# Patient Record
Sex: FEMALE | Race: WHITE
[De-identification: names, ages, dates, MRNs, and addresses within clinical notes are randomized per-mention and may not be internally consistent; named-entity substitution may affect disease eponyms.]

---

## 2018-07-19 NOTE — PCM.CONS
H&P History of Present Illness





- General


Date of Service: 07/19/18


Admit Problem/Dx: 


 Admission Diagnosis/Problem





Admission Diagnosis/Problem      Anemia








Source of Information: Patient


History Limitations: Reports: No Limitations





- History of Present Illness


Initial Comments - Free Text/Narative: 


Patient is a 72 year old female who presents with severe anemia. She was seen 

in clinic today by a primary care provider. Lab work was performed that 

revealed a hgb of 3.5. She was admitted to the hospitalist team and is 

currently being transfused. She denies any change in her bowel habits. She 

denies melena or hematochezia. She has chronic GERD and takes omeprazole for 

this. Her symptoms have been stable. She denies nausea or vomiting. She denies 

abdominal pain. She does admit to slowly feeling weaker and more SOB with 

activity over the last couple months. 





- Related Data


Allergies/Adverse Reactions: 


 Allergies











Allergy/AdvReac Type Severity Reaction Status Date / Time


 


novacaine Allergy  Vomiting Uncoded 02/21/17 14:23











Home Medications: 


 Home Meds





PARoxetine [Paxil] 30 mg PO DAILY 02/21/17 [History]


Rosuvastatin [Crestor] 20 mg PO DAILY 02/21/17 [History]


rOPINIRole [Requip] 1 mg PO BEDTIME 02/21/17 [History]


Albuterol [Ventolin HFA] 2 inh IH Q6H PRN 02/22/17 [History]


Cephalexin [Keflex] 500 mg PO BID #12 capsule 02/22/17 [Rx]


Colestipol [Colestipol HCl] 2 gm PO DAILY PRN 02/22/17 [History]


Fluticasone Propionate [Flonase] 1 gm NASBOTH DAILY #1 bottle 02/22/17 [Rx]


Fluticasone/Salmeterol [Advair 250-50 Diskus] 1 inh IH BID PRN 02/22/17 [History

]


LORazepam [Ativan] 0.25 mg PO BID PRN #5 tablet 02/22/17 [Rx]


Latanoprost [Xalatan 0.005% Ophth Soln] 1 drop EYEBOTH BEDTIME 02/22/17 [History

]


Ursodiol [Actigal] 300 mg PO DAILY PRN 02/22/17 [History]











Past Medical History


HEENT History: Reports: Impaired Vision, Macular Degeneration


Cardiovascular History: Reports: High Cholesterol, Other (See Below)


Other Cardiovascular History: hypotension


Respiratory History: Reports: Asthma, Bronchitis, Recurrent, Pneumonia, 

Recurrent


Gastrointestinal History: Reports: Chronic Diarrhea


Genitourinary History: Reports: None


OB/GYN History: Reports: Pregnancy


Musculoskeletal History: Reports: Arthritis, Back Pain, Chronic, Fracture, 

Osteoarthritis, Osteoporosis


Neurological History: Reports: Vertigo, Other (See Below)


Other Neuro History: panic attacks


Psychiatric History: Reports: Anxiety, Panic Attack


Endocrine/Metabolic History: Reports: None


Hematologic History: Reports: None


Immunologic History: Reports: None


Oncologic (Cancer) History: Reports: None


Dermatologic History: Reports: None





- Infectious Disease History


Infectious Disease History: Reports: Chicken Pox, Measles, Mumps, Rubella


Other Infectious Disease History: wooping cough





- Past Surgical History


HEENT Surgical History: Reports: Cataract Surgery


GI Surgical History: Reports: Colonoscopy, Polypectomy


Female  Surgical History: Reports: Breast Biopsy, Hysterectomy





Social & Family History





- Family History


Family Medical History: Noncontributory





- Tobacco Use


Smoking Status *Q: Never Smoker


Second Hand Smoke Exposure: No





- Caffeine Use


Caffeine Use: Reports: Coffee





- Recreational Drug Use


Recreational Drug Use: No





H&P Review of Systems





- Review of Systems:


Review Of Systems: ROS reveals no pertinent complaints other than HPI.





Exam





- Exam


Exam: See Below





- Vital Signs


Vital Signs: 


 Last Vital Signs











Temp  36.4 C   07/19/18 22:00


 


Pulse  85   07/19/18 22:00


 


Resp  18   07/19/18 21:38


 


BP  123/58 L  07/19/18 22:00


 


Pulse Ox  97   07/19/18 22:00











Weight: 55.656 kg





- Exam


General: Alert, Oriented


HEENT: Conjunctiva Clear, Mucosa Moist & Pink, Posterior Pharynx Clear


Neck: Supple, Trachea Midline


Lungs: Clear to Auscultation, Normal Respiratory Effort


Cardiovascular: Regular Rate, Systolic Murmur


GI/Abdominal Exam: Soft, Non-Tender, No Distention, No Mass


 (Female) Exam: Normal External Exam


Rectal (Female) Exam: Normal Exam, Normal Rectal Tone, Heme - Stool.  No: Black 

Stool, Bloody Stool, Decreased Rectal Tone, Fecal Impaction, Mass


Back Exam: Normal Inspection, Full Range of Motion


Extremities: Normal Inspection, Normal Range of Motion





- Patient Data


Lab Results Last 24 hrs: 


 Laboratory Results - last 24 hr











  07/19/18 07/19/18 07/19/18 Range/Units





  17:55 17:55 17:55 


 


WBC    6.44  (4.0-11.0)  K/uL


 


RBC    2.40 L  (4.30-5.90)  M/uL


 


Hgb    3.5 L*  (12.0-16.0)  g/dL


 


Hct    13.8 L  (36.0-46.0)  %


 


MCV    57.5 L  (80.0-98.0)  fL


 


MCH    14.6 L  (27.0-32.0)  pg


 


MCHC    25.4 L  (31.0-37.0)  g/dL


 


RDW Std Deviation    41.1  (28.0-62.0)  fl


 


RDW Coeff of Ulises    19 H  (11.0-15.0)  %


 


Plt Count    382  (150-400)  K/uL


 


MPV    9.40  (7.40-12.00)  fL


 


Nucleated RBC %    0.0  /100WBC


 


Nucleated RBCs #    0  K/uL


 


Absolute Retic    31.70  (20-80)  K/uL


 


Percent Retic    1.3  (0.5-1.5)  %


 


Immature Retic Fraction    15  %


 


Sodium     (136-145)  mmol/L


 


Potassium     (3.5-5.1)  mmol/L


 


Chloride     ()  mmol/L


 


Carbon Dioxide     (21.0-32.0)  mmol/L


 


BUN     (7.0-18.0)  mg/dL


 


Creatinine     (0.6-1.0)  mg/dL


 


Est Cr Clr Drug Dosing     mL/min


 


Estimated GFR (MDRD)     ml/min


 


Glucose     ()  mg/dL


 


Calcium     (8.5-10.1)  mg/dL


 


Iron     ()  ug/dL


 


TIBC     (250-450)  ug/dL


 


% Saturation     (20-55)  %


 


Ferritin     (8-252)  ng/mL


 


Total Bilirubin     (0.2-1.0)  mg/dL


 


AST     (15-37)  IU/L


 


ALT     (14-63)  IU/L


 


Alkaline Phosphatase     ()  U/L


 


Troponin I  < 0.050    (0.000-0.056)  ng/mL


 


Total Protein     (6.4-8.2)  g/dL


 


Albumin     (3.4-5.0)  g/dL


 


Globulin     (2.0-3.5)  g/dL


 


Albumin/Globulin Ratio     (1.3-2.8)  


 


Vitamin B12     (193-986)  pg/mL


 


Folate     (8.60-58.90)  ng/mL


 


Free T4     (0.76-1.46)  ng/dL


 


TSH 3rd Generation     (0.36-3.74)  uIU/mL


 


Blood Type   A POSITIVE   


 


Antibody Screen   NEGATIVE   


 


Crossmatch   See Detail   














  07/19/18 07/19/18 07/19/18 Range/Units





  17:55 17:55 17:55 


 


WBC     (4.0-11.0)  K/uL


 


RBC     (4.30-5.90)  M/uL


 


Hgb     (12.0-16.0)  g/dL


 


Hct     (36.0-46.0)  %


 


MCV     (80.0-98.0)  fL


 


MCH     (27.0-32.0)  pg


 


MCHC     (31.0-37.0)  g/dL


 


RDW Std Deviation     (28.0-62.0)  fl


 


RDW Coeff of Ulises     (11.0-15.0)  %


 


Plt Count     (150-400)  K/uL


 


MPV     (7.40-12.00)  fL


 


Nucleated RBC %     /100WBC


 


Nucleated RBCs #     K/uL


 


Absolute Retic     (20-80)  K/uL


 


Percent Retic     (0.5-1.5)  %


 


Immature Retic Fraction     %


 


Sodium   138   (136-145)  mmol/L


 


Potassium   3.4 L   (3.5-5.1)  mmol/L


 


Chloride   104   ()  mmol/L


 


Carbon Dioxide   24.8   (21.0-32.0)  mmol/L


 


BUN   7   (7.0-18.0)  mg/dL


 


Creatinine   0.8   (0.6-1.0)  mg/dL


 


Est Cr Clr Drug Dosing   48.80   mL/min


 


Estimated GFR (MDRD)   > 60.0   ml/min


 


Glucose   90   ()  mg/dL


 


Calcium   8.4 L   (8.5-10.1)  mg/dL


 


Iron    9 L  ()  ug/dL


 


TIBC    418  (250-450)  ug/dL


 


% Saturation    2.15 L  (20-55)  %


 


Ferritin    3 L  (8-252)  ng/mL


 


Total Bilirubin   0.2   (0.2-1.0)  mg/dL


 


AST   17   (15-37)  IU/L


 


ALT   11 L   (14-63)  IU/L


 


Alkaline Phosphatase   69   ()  U/L


 


Troponin I     (0.000-0.056)  ng/mL


 


Total Protein   6.4   (6.4-8.2)  g/dL


 


Albumin   3.2 L   (3.4-5.0)  g/dL


 


Globulin   3.2   (2.0-3.5)  g/dL


 


Albumin/Globulin Ratio   1.0 L   (1.3-2.8)  


 


Vitamin B12   302   (193-986)  pg/mL


 


Folate   21.60   (8.60-58.90)  ng/mL


 


Free T4   0.94   (0.76-1.46)  ng/dL


 


TSH 3rd Generation  2.76    (0.36-3.74)  uIU/mL


 


Blood Type     


 


Antibody Screen     


 


Crossmatch     











Result Diagrams: 


 07/19/18 17:55





 07/19/18 17:55


Sarbjit Results Last 24 hrs: 


 Microbiology











 07/19/18 22:06 Stool Occult Blood (SARBJIT) - Final





 Stool / Feces    POSITIVE OCCULT BLOOD














Consult PN Assessment/Plan


Procedures: 


Procedures





ASSAY OF CK (CPK) (05/06/14)


ASSAY OF LACTIC ACID (02/21/17)


ASSAY OF LIPASE (08/01/14)


ASSAY OF MAGNESIUM (02/21/17)


ASSAY OF TROPONIN QUANT (02/21/17)


BLOOD CULTURE FOR BACTERIA (02/21/17)


CHEST X-RAY 2VW FRONTAL&LATL (02/21/17)


COMPLETE CBC W/AUTO DIFF WBC (02/21/17)


COMPREHEN METABOLIC PANEL (02/21/17)


CONTRAST X-RAY ESOPHAGUS (05/04/15)


CREATINE MB FRACTION (05/06/14)


CT ABD & PELV W/CONTRAST (08/01/14)


CT ANGIOGRAPHY HEAD (02/23/18)


CT ANGIOGRAPHY NECK (02/23/18)


CT HEAD/BRAIN W/O & W/DYE (02/23/18)


CT HEAD/BRAIN W/O DYE (05/06/14)


ELECTROCARDIOGRAM TRACING (02/21/17)


EMERGENCY DEPT VISIT (02/21/17)


EMERGENCY DEPT VISIT (08/01/14)


EMERGENCY DEPT VISIT (05/06/14)


HYDRATE IV INFUSION ADD-ON (02/21/17)


METABOLIC PANEL TOTAL CA (02/23/18)


ROUTINE VENIPUNCTURE (02/23/18)


THER/PROPH/DIAG INJ IV PUSH (05/06/14)


THER/PROPH/DIAG INJ SC/IM (05/06/14)


THER/PROPH/DIAG IV INF INIT (02/21/17)


TX/PRO/DX INJ NEW DRUG ADDON (02/21/17)


TX/PRO/DX INJ SAME DRUG ADON (08/01/14)


URINALYSIS AUTO W/SCOPE (02/21/17)


URINE CULTURE/COLONY COUNT (02/21/17)


X-RAY EXAM OF ABDOMEN (08/01/14)








(1) Anemia


SNOMED Code(s): 042561786


   Code(s): D64.9 - ANEMIA, UNSPECIFIED   Current Visit: Yes   


Problem List Initiated/Reviewed/Updated: Yes


Plan: 


Patient is currently receiving a blood transfusion. She has no evidence of a GI 

bleed at this time. I did collect a FOBT but grossly her stool appeared normal. 

I added on an H pylori antigen test. Will see how she responds to her 

transfusion. I recommend a routine CT abdomen pelvis while inpatient to rule 

out a source of bleeding (i.e. colonic mass, diverticulitis, ulcer ect.) If she 

responds well to transfusions and remains stable, can follow up as an 

outpatient for an EGD and colonoscopy. Will continue to follow. Call with 

questions.

## 2018-07-19 NOTE — HP
YOB: 1945

 

PRIMARY CARE PHYSICIAN:

None PCP

 

HISTORY OF PRESENT ILLNESS:

The patient is a 72-year-old female with a past medical history of osteoporosis,

glaucoma, asthma, depression, anxiety, restless legs syndrome, hyperlipidemia,

severe headaches secondary to Arnold-Chiari malformation, status post repair,

presented to her doctor's office today because she was feeling very weak, and

this is going on for the past 8 to 10 months, and is more severe since

.  The patient has severe vertigo.  She also fainted.  She denies any

blood per rectum or any black stools, no vomiting of red  blood .  Friday night
, she had vomiting and

diarrhea and it was brown.  She denies coffee-ground.  She has occasionally pain

in the abdomen that it is localized mostly in the middle of the abdomen and

lower abdomen.  Today in the office she was seen by the   nurse practitioner, 
and  blood work results came back with a hemoglobin of 4.  I was called to 
direct admit the patient, and

the patient called home to come to the hospital.

 

PAST MEDICAL HISTORY:

She has arthritis, vertigo, macular degeneration, glaucoma, and she was seen in

ER a few times because of pain in the abdomen.

 

ALLERGIES:

She is allergic to novocaine, constipation.

 

PAST SURGICAL HISTORY:

She had Arnold-Chiari malformation surgery in .  She was not allowed to have

MRI.  She had gallbladder surgery in  and she had hysterectomy.

 

FAMILY HISTORY:

She had multiple relatives with cancer.  Her daughter  of a brain cancer

last year and she was 53.  Her brother  of colon cancer at 63.  Mother 

of cancer in the abdomen.  Patient is not sure if it was stomach cancer or colon

cancer.

 

SOCIAL HISTORY:

She quit smoking in , and she smoked for about 15 years.  Alcohol use

occasionally. She used to work as a .

 

REVIEW OF SYSTEMS:

She complains of pain in her body and in her muscles, extream weakness , sob.

Otherwise 12 points ROS is negative except as in HPI



VITAL SIGNS:  At admission, her temperature 98, pulse rate 101, and blood

pressure 97/68, and respiratory rate 18, pulse oximetry 100%

 

PHYSICAL EXAMINATION:



HEENT:  Head is atraumatic, normocephalic.  Pupils equally reactive to light.

Pallor of the eye conjunctivae.

NECK:  Supple.  No thyromegaly.  No lymphadenopathy.

HEART:  S1 and S2.  Regular rate and rate.

LUNGS:  Clear to auscultation bilaterally.

ABDOMEN:  Soft, nontender.  Positive bowel sounds.

  It is soft.  There is tenderness to palpation in the middle of the

abdomen, lower of the abdomen, and also some mild tenderness right upper

quadrant.

EXTREMITIES:  No edema.

NEUROLOGIC:  The patient is alert, oriented x3.  There are no gross focal

neurological deficits.

 skin: pale



LABORATORY DATA:

Repeat blood work done here show a WBC 6.44, hemoglobin 3.5, hematocrit 13.8,

MCV 57.5, RDW 41.1, platelet count 382.  Sodium 138, potassium 3.4, chloride

104, carbon dioxide 24.8, BUN 7, creatinine 0.8, estimated creatinine clearance

48.8, glucose 90, calcium 8.4.  Iron 9, TIBC 414, percent saturation 2.15,

ferritin 3.  Total bilirubin 0.2, AST 17, ALT 11, alkaline phosphatase 69.

Troponin less than 0.050.  Total protein 6.4, albumin 3.2, globulin 3.2, folate

21.6.  Vitamin B12 of 302.  Free T4 of 0.94.  TSH 2.96.  EKG showed sinus rate

of 89, normal P axis, and repolarization abnormality suggesting ischemia,

anterolateral ST depression, T negative in lead 1, aVL, V2, and V6.

 

ASSESSMENT:

1. Severe anemia likely due to chronic blood loss and hemoglobin of 2.5.

2. Iron deficiency.

3. Vitamin B12 deficiency.

4. Ischemic changes on the EKG.

5. Osteoporosis.

6. Depression.

7. Asthma.

8. Severe vertigo.

9. Lightheadedness.

10.Glaucoma.

11.Restless legs syndrome.

12.Hyperlipidemia.

 

PLAN:

We will admit the patient to medical telemetry and we will transfuse patient 3

units of blood.  We will follow up hemoglobin and hematocrit.  We will give the

patient Lasix between the transfusion if SOB.  For ischemic changes on the EKG, 
we will

give patient Nitro-Bid to anterior chest wall and will monitor the patient in

telemetry.  We will cycle troponins x3.  I have discussed the patient  with 
Surgery ,will need 

referral to have EGD colonoscopy  as outpatient  We will order a CT of the

abdomen with p.o. and IV contrast to be done after the first unit of blood

transfused.  We will hold aspirin. For the osteoporosis we will continue

patient with calcium and vitamin D b.i.d.  For asthma, we will continue patient

with Advair Diskus one puff inhaled b.i.d.  For severe anemia, iron deficiency,

we will put the patient on Protonix 40 mg IV b.i.d.  For depression, we will

continue patient with paroxetine 30 mg p.o. daily.  For headache, we will

continue patient with riboflavin 400 mg p.o. daily.  For the restless leg

syndrome, the patient will be continued on ropinirole 1 mg p.o. daily.  For

hyperlipidemia, the patient will be continued with rosuvastatin 20 mg p.o.

daily.  For DVT prophylaxis, we will put patient on SCD.  For iron-deficiency

anemia, also we will start the patient on iron sulfate 300 mg p.o. t.i.d.

 

 

TAMRA / CANELO

DD:  2018 21:07:11

DT:  2018 22:54:01

Job #:  [id number]

MT[facility]OLINA

## 2018-07-20 NOTE — CT
EXAM DATE: 18



PATIENT'S AGE: 72



Patient: BRYON VILLEDA



Facility: [place], [place]

Patient ID: [id number]

Site Patient ID: [id number].

Site Accession #: [id number]

: 1945

Study: CT Abdomen/Pelvis W/ and W/O Cont [id number]/19/2018 11:04:15 PM

Ordering Physician: Larisa Montero



Final Report: 

INDICATION: Abdominal pain



TECHNIQUE: CT Abdomen and pelvis with and without i.v. contrast. Coronal and 
sagittal reformats were obtained.



CONTRAST: 75 mL Isovue 370



COMPARISON: 2014



FINDINGS: 

Lower chest: Unremarkable. 

Liver: Multiple liver cysts are present with the largest measuring 2.5 cm. 

Spleen: Unremarkable. 

Pancreas: Unremarkable. 

Gallbladder: Previous cholecystectomy noted with mild intra and extrahepatic 
biliary ductal dilatation seen. 

Kidney: Moderate focal cortical scarring is seen in the upper pole of the left 
kidney. The right kidney is unremarkable in appearance. 

Adrenal: Unremarkable. 

Bowel: Moderate sigmoid diverticulosis is present with no evidence of 
diverticulitis. The appendix is not visualized. 

Vascular: Unremarkable. 

Lymph: Unremarkable. 

Peritoneum: Unremarkable. No pneumoperitoneum is seen. No significant ascites 
is noted. 

Pelvis: There is an enhancing nodule along the right posterior lateral aspect 
of the bladder measuring 1.3 cm. 

Soft tissue: Unremarkable. 

Bone: Unremarkable for age. 



IMPRESSION: 

1. There is an enhancing nodule along the right posterior lateral aspect of the 
bladder measuring 1.3 cm. Further evaluation with cystoscopy and biopsy is 
recommended to exclude a bladder neoplasm.



Dictated by Power Jain MD @ 2018 11:27:33 PM



Please note that all CT scans at this facility use dose modulation, iterative 
reconstruction, and/or weight-based dosing when appropriate to reduce radiation 
dose to as low as reasonably achievable.



Dictated by: Power Jain MD @ 2018 23:27:36

(Electronic Signature)





Report Signed by Proxy.
[facility]OLINA

## 2018-07-20 NOTE — PCM.PN
- General Info


Date of Service: 07/20/18


Admission Dx/Problem (Free Text): 





Patient  s/p transfusion 3 units of blood last night , her hemoglobin in am was 

8.5.Patient has iron deficiency anemia and was started on iron sulphate 325mg 

po TID.


2 more units of blood were ordered today. Her Ct of the abdomen and pelvis 

shows she has a urinary bladder nodule of 1.3 cm


 Patient is dfeeling better today . Her B12 level in 300 , will supplement B12 

with B12 sq 1000 mcg





- Review of Systems


General: Reports: Weakness


HEENT: Reports: No Symptoms


Pulmonary: Reports: No Symptoms


Cardiovascular: Reports: Dyspnea on Exertion


Genitourinary: Reports: No Symptoms


Musculoskeletal: Reports: No Symptoms


Skin: Reports: No Symptoms


Neurological: Reports: No Symptoms


Psychiatric: Reports: No Symptoms





- Patient Data


Vitals - Most Recent: 


 Last Vital Signs











Temp  97.6 F   07/20/18 14:14


 


Pulse  81   07/20/18 14:14


 


Resp  16   07/20/18 14:14


 


BP  144/53 H  07/20/18 14:14


 


Pulse Ox  93 L  07/20/18 14:14











Weight - Most Recent: 122 lb 11.2 oz


I&O - Last 24 Hours: 


 Intake & Output











 07/19/18 07/20/18 07/20/18





 22:59 06:59 14:59


 


Intake Total 350 895 0


 


Output Total  400 


 


Balance 350 495 0











Lab Results Last 24 Hours: 


 Laboratory Results - last 24 hr











  07/19/18 07/19/18 07/19/18 Range/Units





  17:55 17:55 17:55 


 


WBC    6.44  (4.0-11.0)  K/uL


 


RBC    2.40 L  (4.30-5.90)  M/uL


 


Hgb    3.5 L*  (12.0-16.0)  g/dL


 


Hct    13.8 L  (36.0-46.0)  %


 


MCV    57.5 L  (80.0-98.0)  fL


 


MCH    14.6 L  (27.0-32.0)  pg


 


MCHC    25.4 L  (31.0-37.0)  g/dL


 


RDW Std Deviation    41.1  (28.0-62.0)  fl


 


RDW Coeff of Ulises    19 H  (11.0-15.0)  %


 


Plt Count    382  (150-400)  K/uL


 


MPV    9.40  (7.40-12.00)  fL


 


Neut % (Auto)     (48.0-80.0)  %


 


Lymph % (Auto)     (16.0-40.0)  %


 


Mono % (Auto)     (0.0-15.0)  %


 


Eos % (Auto)     (0.0-7.0)  %


 


Baso % (Auto)     (0.0-1.5)  %


 


Neut # (Auto)     (1.4-5.7)  K/uL


 


Lymph # (Auto)     (0.6-2.4)  K/uL


 


Mono # (Auto)     (0.0-0.8)  K/uL


 


Eos # (Auto)     (0.0-0.7)  K/uL


 


Baso # (Auto)     (0.0-0.1)  K/uL


 


Nucleated RBC %    0.0  /100WBC


 


Nucleated RBCs #    0  K/uL


 


Absolute Retic    31.70  (20-80)  K/uL


 


Percent Retic    1.3  (0.5-1.5)  %


 


Immature Retic Fraction    15  %


 


Sodium     (136-145)  mmol/L


 


Potassium     (3.5-5.1)  mmol/L


 


Chloride     ()  mmol/L


 


Carbon Dioxide     (21.0-32.0)  mmol/L


 


BUN     (7.0-18.0)  mg/dL


 


Creatinine     (0.6-1.0)  mg/dL


 


Est Cr Clr Drug Dosing     mL/min


 


Estimated GFR (MDRD)     ml/min


 


Glucose     ()  mg/dL


 


Calcium     (8.5-10.1)  mg/dL


 


Iron     ()  ug/dL


 


TIBC     (250-450)  ug/dL


 


% Saturation     (20-55)  %


 


Ferritin     (8-252)  ng/mL


 


Total Bilirubin     (0.2-1.0)  mg/dL


 


AST     (15-37)  IU/L


 


ALT     (14-63)  IU/L


 


Alkaline Phosphatase     ()  U/L


 


Troponin I  < 0.050    (0.000-0.056)  ng/mL


 


Total Protein     (6.4-8.2)  g/dL


 


Albumin     (3.4-5.0)  g/dL


 


Globulin     (2.0-3.5)  g/dL


 


Albumin/Globulin Ratio     (1.3-2.8)  


 


Vitamin B12     (193-986)  pg/mL


 


Folate     (8.60-58.90)  ng/mL


 


Free T4     (0.76-1.46)  ng/dL


 


TSH 3rd Generation     (0.36-3.74)  uIU/mL


 


H. pylori IgG Antibody     (NEG)  


 


Blood Type   A POSITIVE   


 


Antibody Screen   NEGATIVE   


 


Crossmatch   See Detail   














  07/19/18 07/19/18 07/19/18 Range/Units





  17:55 17:55 17:55 


 


WBC     (4.0-11.0)  K/uL


 


RBC     (4.30-5.90)  M/uL


 


Hgb     (12.0-16.0)  g/dL


 


Hct     (36.0-46.0)  %


 


MCV     (80.0-98.0)  fL


 


MCH     (27.0-32.0)  pg


 


MCHC     (31.0-37.0)  g/dL


 


RDW Std Deviation     (28.0-62.0)  fl


 


RDW Coeff of Ulises     (11.0-15.0)  %


 


Plt Count     (150-400)  K/uL


 


MPV     (7.40-12.00)  fL


 


Neut % (Auto)     (48.0-80.0)  %


 


Lymph % (Auto)     (16.0-40.0)  %


 


Mono % (Auto)     (0.0-15.0)  %


 


Eos % (Auto)     (0.0-7.0)  %


 


Baso % (Auto)     (0.0-1.5)  %


 


Neut # (Auto)     (1.4-5.7)  K/uL


 


Lymph # (Auto)     (0.6-2.4)  K/uL


 


Mono # (Auto)     (0.0-0.8)  K/uL


 


Eos # (Auto)     (0.0-0.7)  K/uL


 


Baso # (Auto)     (0.0-0.1)  K/uL


 


Nucleated RBC %     /100WBC


 


Nucleated RBCs #     K/uL


 


Absolute Retic     (20-80)  K/uL


 


Percent Retic     (0.5-1.5)  %


 


Immature Retic Fraction     %


 


Sodium   138   (136-145)  mmol/L


 


Potassium   3.4 L   (3.5-5.1)  mmol/L


 


Chloride   104   ()  mmol/L


 


Carbon Dioxide   24.8   (21.0-32.0)  mmol/L


 


BUN   7   (7.0-18.0)  mg/dL


 


Creatinine   0.8   (0.6-1.0)  mg/dL


 


Est Cr Clr Drug Dosing   48.80   mL/min


 


Estimated GFR (MDRD)   > 60.0   ml/min


 


Glucose   90   ()  mg/dL


 


Calcium   8.4 L   (8.5-10.1)  mg/dL


 


Iron    9 L  ()  ug/dL


 


TIBC    418  (250-450)  ug/dL


 


% Saturation    2.15 L  (20-55)  %


 


Ferritin    3 L  (8-252)  ng/mL


 


Total Bilirubin   0.2   (0.2-1.0)  mg/dL


 


AST   17   (15-37)  IU/L


 


ALT   11 L   (14-63)  IU/L


 


Alkaline Phosphatase   69   ()  U/L


 


Troponin I     (0.000-0.056)  ng/mL


 


Total Protein   6.4   (6.4-8.2)  g/dL


 


Albumin   3.2 L   (3.4-5.0)  g/dL


 


Globulin   3.2   (2.0-3.5)  g/dL


 


Albumin/Globulin Ratio   1.0 L   (1.3-2.8)  


 


Vitamin B12   302   (193-986)  pg/mL


 


Folate   21.60   (8.60-58.90)  ng/mL


 


Free T4   0.94   (0.76-1.46)  ng/dL


 


TSH 3rd Generation  2.76    (0.36-3.74)  uIU/mL


 


H. pylori IgG Antibody     (NEG)  


 


Blood Type     


 


Antibody Screen     


 


Crossmatch     














  07/19/18 07/19/18 07/20/18 Range/Units





  17:55 23:55 05:53 


 


WBC    6.05  (4.0-11.0)  K/uL


 


RBC    4.02 L  (4.30-5.90)  M/uL


 


Hgb    8.5 L  (12.0-16.0)  g/dL


 


Hct    26.9 L  (36.0-46.0)  %


 


MCV    66.9 L  (80.0-98.0)  fL


 


MCH    21.1 L  (27.0-32.0)  pg


 


MCHC    31.6  (31.0-37.0)  g/dL


 


RDW Std Deviation    60.6  (28.0-62.0)  fl


 


RDW Coeff of Ulises    25 H  (11.0-15.0)  %


 


Plt Count    281  (150-400)  K/uL


 


MPV    9.50  (7.40-12.00)  fL


 


Neut % (Auto)    66.8  (48.0-80.0)  %


 


Lymph % (Auto)    19.8  (16.0-40.0)  %


 


Mono % (Auto)    12.6  (0.0-15.0)  %


 


Eos % (Auto)    0.0  (0.0-7.0)  %


 


Baso % (Auto)    0.8  (0.0-1.5)  %


 


Neut # (Auto)    4.0  (1.4-5.7)  K/uL


 


Lymph # (Auto)    1.2  (0.6-2.4)  K/uL


 


Mono # (Auto)    0.8  (0.0-0.8)  K/uL


 


Eos # (Auto)    0.0  (0.0-0.7)  K/uL


 


Baso # (Auto)    0.1  (0.0-0.1)  K/uL


 


Nucleated RBC %    0.0  /100WBC


 


Nucleated RBCs #    0  K/uL


 


Absolute Retic     (20-80)  K/uL


 


Percent Retic     (0.5-1.5)  %


 


Immature Retic Fraction     %


 


Sodium     (136-145)  mmol/L


 


Potassium     (3.5-5.1)  mmol/L


 


Chloride     ()  mmol/L


 


Carbon Dioxide     (21.0-32.0)  mmol/L


 


BUN     (7.0-18.0)  mg/dL


 


Creatinine     (0.6-1.0)  mg/dL


 


Est Cr Clr Drug Dosing     mL/min


 


Estimated GFR (MDRD)     ml/min


 


Glucose     ()  mg/dL


 


Calcium     (8.5-10.1)  mg/dL


 


Iron     ()  ug/dL


 


TIBC     (250-450)  ug/dL


 


% Saturation     (20-55)  %


 


Ferritin     (8-252)  ng/mL


 


Total Bilirubin     (0.2-1.0)  mg/dL


 


AST     (15-37)  IU/L


 


ALT     (14-63)  IU/L


 


Alkaline Phosphatase     ()  U/L


 


Troponin I   < 0.050   (0.000-0.056)  ng/mL


 


Total Protein     (6.4-8.2)  g/dL


 


Albumin     (3.4-5.0)  g/dL


 


Globulin     (2.0-3.5)  g/dL


 


Albumin/Globulin Ratio     (1.3-2.8)  


 


Vitamin B12     (193-986)  pg/mL


 


Folate     (8.60-58.90)  ng/mL


 


Free T4     (0.76-1.46)  ng/dL


 


TSH 3rd Generation     (0.36-3.74)  uIU/mL


 


H. pylori IgG Antibody  NEGATIVE    (NEG)  


 


Blood Type     


 


Antibody Screen     


 


Crossmatch     














  07/20/18 07/20/18 Range/Units





  05:53 05:53 


 


WBC    (4.0-11.0)  K/uL


 


RBC    (4.30-5.90)  M/uL


 


Hgb    (12.0-16.0)  g/dL


 


Hct    (36.0-46.0)  %


 


MCV    (80.0-98.0)  fL


 


MCH    (27.0-32.0)  pg


 


MCHC    (31.0-37.0)  g/dL


 


RDW Std Deviation    (28.0-62.0)  fl


 


RDW Coeff of Ulises    (11.0-15.0)  %


 


Plt Count    (150-400)  K/uL


 


MPV    (7.40-12.00)  fL


 


Neut % (Auto)    (48.0-80.0)  %


 


Lymph % (Auto)    (16.0-40.0)  %


 


Mono % (Auto)    (0.0-15.0)  %


 


Eos % (Auto)    (0.0-7.0)  %


 


Baso % (Auto)    (0.0-1.5)  %


 


Neut # (Auto)    (1.4-5.7)  K/uL


 


Lymph # (Auto)    (0.6-2.4)  K/uL


 


Mono # (Auto)    (0.0-0.8)  K/uL


 


Eos # (Auto)    (0.0-0.7)  K/uL


 


Baso # (Auto)    (0.0-0.1)  K/uL


 


Nucleated RBC %    /100WBC


 


Nucleated RBCs #    K/uL


 


Absolute Retic    (20-80)  K/uL


 


Percent Retic    (0.5-1.5)  %


 


Immature Retic Fraction    %


 


Sodium  141   (136-145)  mmol/L


 


Potassium  4.2   (3.5-5.1)  mmol/L


 


Chloride  107   ()  mmol/L


 


Carbon Dioxide  23.5   (21.0-32.0)  mmol/L


 


BUN  6 L   (7.0-18.0)  mg/dL


 


Creatinine  0.8   (0.6-1.0)  mg/dL


 


Est Cr Clr Drug Dosing  48.80   mL/min


 


Estimated GFR (MDRD)  > 60.0   ml/min


 


Glucose  97   ()  mg/dL


 


Calcium  8.4 L   (8.5-10.1)  mg/dL


 


Iron    ()  ug/dL


 


TIBC    (250-450)  ug/dL


 


% Saturation    (20-55)  %


 


Ferritin    (8-252)  ng/mL


 


Total Bilirubin  0.7   (0.2-1.0)  mg/dL


 


AST  17   (15-37)  IU/L


 


ALT  14   (14-63)  IU/L


 


Alkaline Phosphatase  71   ()  U/L


 


Troponin I   < 0.050  (0.000-0.056)  ng/mL


 


Total Protein  6.3 L   (6.4-8.2)  g/dL


 


Albumin  3.1 L   (3.4-5.0)  g/dL


 


Globulin  3.2   (2.0-3.5)  g/dL


 


Albumin/Globulin Ratio  1.0 L   (1.3-2.8)  


 


Vitamin B12    (193-986)  pg/mL


 


Folate    (8.60-58.90)  ng/mL


 


Free T4    (0.76-1.46)  ng/dL


 


TSH 3rd Generation    (0.36-3.74)  uIU/mL


 


H. pylori IgG Antibody    (NEG)  


 


Blood Type    


 


Antibody Screen    


 


Crossmatch    











Sarbjit Results Last 24 Hours: 


 Microbiology











 07/19/18 22:06 Stool Occult Blood (SARBJIT) - Final





 Stool / Feces    POSITIVE OCCULT BLOOD











Med Orders - Current: 


 Current Medications





Albuterol/Ipratropium (Duoneb 3.0-0.5 Mg/3 Ml)  3 ml NEB Q4HRRT PRN


   PRN Reason: Shortness Of Breath/wheezing


Ferrous Sulfate (Ferrous Sulfate)  300 mg PO TID [facility]


   Last Admin: 07/20/18 13:11 Dose:  300 mg


Morphine Sulfate (Morphine)  2 mg IVPUSH Q2H PRN


   PRN Reason: Pain (severe 7-10)


   Stop: 07/20/18 17:40


Pantoprazole Sodium (Protonix Iv***)  40 mg IVPUSH BID [facility]


   Last Admin: 07/20/18 09:53 Dose:  40 mg


Sodium Chloride (Saline Flush)  10 ml FLUSH ASDIRECTED PRN


   PRN Reason: Keep Vein Open


Sodium Chloride (Saline Flush)  2.5 ml FLUSH ASDIRECTED PRN


   PRN Reason: Keep Vein Open





Discontinued Medications





Cyanocobalamin (Vitamin B12)  1,000 mcg SUBCUT ONETIME ONE


   Stop: 07/19/18 20:55


   Last Admin: 07/19/18 21:47 Dose:  Not Given


Cyanocobalamin (Vitamin B12)  1,000 mcg IM ONETIME ONE


   Stop: 07/19/18 21:01


   Last Admin: 07/19/18 22:17 Dose:  1,000 mcg


Cyanocobalamin (Vitamin B12)  1,000 mcg IM ONETIME ONE


   Stop: 07/20/18 11:00


   Last Admin: 07/20/18 13:11 Dose:  1,000 mcg


Iopamidol (Isovue-370 (76%))  75 ml IVPUSH ONETIME STA


   Stop: 07/19/18 23:09


   Last Admin: 07/19/18 23:08 Dose:  75 ml


Miscellaneous Information (Remove Patch)  1 ea TRDERM ONETIME ONE


   Stop: 07/20/18 10:01


   Last Admin: 07/20/18 09:59 Dose:  Not Given


Nitroglycerin (Nitro-Dur 0.2 Mg/Hr)  0.2 mg TRDERM ONETIME ONE


   Stop: 07/19/18 21:08


   Last Admin: 07/19/18 21:37 Dose:  Not Given











- Exam


General: Alert, Oriented


HEENT: Pupils Equal, Pupils Reactive


Neck: Supple


Lungs: Clear to Auscultation, Normal Respiratory Effort


Cardiovascular: Regular Rate, Regular Rhythm, No Murmurs


GI/Abdominal Exam: Normal Bowel Sounds, Soft, Non-Tender


Back Exam: Normal Inspection


Extremities: Normal Inspection





- Problem List & Annotations


(1) Iron deficiency anemia due to chronic blood loss


SNOMED Code(s): 597371859


   Code(s): D50.0 - IRON DEFICIENCY ANEMIA SECONDARY TO BLOOD LOSS (CHRONIC)   

Status: Acute   Current Visit: Yes   





(2) Mass of urinary bladder


SNOMED Code(s): 445896365


   Code(s): N32.89 - OTHER SPECIFIED DISORDERS OF BLADDER   Status: Acute   

Current Visit: Yes   





(3) Weight loss, unintentional


SNOMED Code(s): 305604860


   Code(s): R63.4 - ABNORMAL WEIGHT LOSS   Status: Acute   Current Visit: Yes   





(4) Cardiac ischemia


SNOMED Code(s): 475558934


   Code(s): I25.9 - CHRONIC ISCHEMIC HEART DISEASE, UNSPECIFIED   Status: Acute

   Current Visit: Yes   





- Problem List Review


Problem List Initiated/Reviewed/Updated: Yes





- My Orders


Last 24 Hours: 


My Active Orders





07/19/18 17:38


Patient Status [ADT] Routine 


Bedrest Bedside Commode [RC] ASDIRECTED 


Vital Signs [RC] Q4H 


Albuterol/Ipratropium [DuoNeb 3.0-0.5 MG/3 ML]   3 ml NEB Q4HRRT PRN 


Morphine   2 mg IVPUSH Q2H PRN 


Sodium Chloride 0.9% [Saline Flush]   10 ml FLUSH ASDIRECTED PRN 


Sodium Chloride 0.9% [Saline Flush]   2.5 ml FLUSH ASDIRECTED PRN 


Peripheral IV Insertion Adult [OM.PC] Routine 


Resuscitation Status Routine 





07/19/18 17:39


Cardiac Monitoring [RC] CONTINUOUS 





07/19/18 17:41


RT Aerosol Therapy [RC] ASDIRECTED 





07/19/18 17:45


Transfuse Red Blood Cells [COMM] Routine 





07/19/18 17:46


Telemetry Monitoring [Cardiac Monitoring] [RC] Q8H 





07/19/18 17:55


RED BLOOD CELLS LP [BBK] Routine 


TYPE AND SCREEN [BBK] Routine 





07/19/18 18:00


Pantoprazole [ProTONIX IV***]   40 mg IVPUSH BID 





07/19/18 20:52


Abdomen Pelvis w wo Cont [CT] Routine 





07/19/18 22:00


Ferrous Sulfate   300 mg PO TID 





07/19/18 22:06


OCCULT BLOOD DIAGNOSTIC [OP] Routine 





07/19/18 Dinner


Nothing per Oral Now Diet [DIET] 





07/20/18 10:23


Transfuse Red Blood Cells [COMM] Routine 





07/20/18 10:57


URINALYSIS W/MICROSCOPIC [UA W/MICROSCOPIC] [URIN] Routine 





07/20/18 10:58


Consult to Physician [CONS] Routine 





07/20/18 10:59


Notify Provider Consults [RC] ASDIRECTED 





07/20/18 11:04


Consult to Physician [CONS] Routine 





07/20/18 11:05


Notify Provider Consults [RC] ASDIRECTED 





07/21/18 05:11


CBC WITH AUTO DIFF [HEME] AM 


COMPREHENSIVE METABOLIC PN,CMP [CHEM] AM 





07/22/18 05:11


CBC WITH AUTO DIFF [HEME] AM 


COMPREHENSIVE METABOLIC PN,CMP [CHEM] AM 





07/23/18 05:11


CBC WITH AUTO DIFF [HEME] AM 


COMPREHENSIVE METABOLIC PN,CMP [CHEM] AM 





07/24/18 05:11


CBC WITH AUTO DIFF [HEME] AM 








a/p


Severe anemia due to chronic blood loss - will transfuse patient 2 more units 

of blood, f/up Hb post transfusion


Protonix 40 mg iv q 12h, f/up with Dr. Hartmann as outpatient for EGD , 

colonoscopy, H pylori negative


start patient on Ferrous sulphate 325 mg po TID


Bladder mass- will order UA and I have called Dr. Antonio to consult for 

cystoscopy and biopsy


DVT prof - scd


Low B 12 level- will supplement B12 with 1000 mcg B12 sq

## 2018-07-20 NOTE — PCM.SN
- Free Text/Narrative


Note: 





Patient's CT abdomen/pelvis shows a lesion in the bladder. I reviewed this with 

our radiologist. He feels the appearance suggests a possible malignancy. She 

does have moderate diverticuli in the colon. The patient's hemoglobin improved 

significantly with 3 units of blood. Her fecal occult blood test was positive. 

She should follow-up with a urologist and can follow-up with me in 2-4 weeks 

for an outpatient EGD and colonoscopy. She should begin her workup from a 

urology standpoint first. Please call with any further questions or concerns. 

Will sign off at this point in time.

## 2018-07-21 NOTE — [email or web address]
**Discharge Summary





- Hospital Course


Diagnosis: Stroke: No





- Discharge Data


Discharge Date: 07/21/18


Discharge Disposition: Home, Self-Care 01


Condition: Good





- Discharge Diagnosis/Problem(s)


(1) Iron deficiency anemia due to chronic blood loss


SNOMED Code(s): 567201786


   ICD Code: D50.0 - IRON DEFICIENCY ANEMIA SECONDARY TO BLOOD LOSS (CHRONIC)   

Status: Acute   





(2) Mass of urinary bladder


SNOMED Code(s): 572214897


   ICD Code: N32.89 - OTHER SPECIFIED DISORDERS OF BLADDER   Status: Acute   





(3) Weight loss, unintentional


SNOMED Code(s): 545616421


   ICD Code: R63.4 - ABNORMAL WEIGHT LOSS   Status: Acute   





(4) Cardiac ischemia


SNOMED Code(s): 356831796


   ICD Code: I25.9 - CHRONIC ISCHEMIC HEART DISEASE, UNSPECIFIED   Status: 

Acute   





- Patient Summary/Data


Consults: 


 Consultations





07/20/18 10:58


Consult to Physician [CONS] Routine 





07/20/18 11:04


Consult to Physician [CONS] Routine 














- Patient Instructions


Diet: Usual Diet as Tolerated


Activity: As Tolerated


Driving: May Drive Today





- Discharge Plan


Prescriptions/Med Rec: 


Ferrous Sulfate 300 mg PO TID 60 Days #1 cup


Pantoprazole [ProTONIX***] 40 mg PO DAILY #120 tab.cr


Home Medications: 


 Home Meds





PARoxetine [Paxil] 30 mg PO DAILY 02/21/17 [History]


Rosuvastatin [Crestor] 20 mg PO DAILY 02/21/17 [History]


rOPINIRole [Requip] 1 mg PO BEDTIME 02/21/17 [History]


Colestipol [Colestipol HCl] 2 gm PO DAILY PRN 02/22/17 [History]


Fluticasone Propionate [Flonase] 1 gm NASBOTH DAILY #1 bottle 02/22/17 [Rx]


Fluticasone/Salmeterol [Advair 250-50 Diskus] 1 inh IH BID PRN 02/22/17 [History

]


LORazepam [Ativan] 0.25 mg PO BID PRN #5 tablet 02/22/17 [Rx]


Latanoprost [Xalatan 0.005% Ophth Soln] 1 drop EYEBOTH BEDTIME 02/22/17 [History

]


Ursodiol [Actigal] 300 mg PO DAILY PRN 02/22/17 [History]


Acetaminophen [Tylenol] 650 mg PO Q4H PRN  tablet 07/21/18 [Rx]


Albuterol/Ipratropium [DuoNeb 3.0-0.5 MG/3 ML] 3 ml NEB Q4HRRT PRN  neb 07/21/ 18 [Rx]


Ferrous Sulfate 300 mg PO TID 60 Days #1 cup 07/21/18 [Rx]


Pantoprazole [ProTONIX***] 40 mg PO DAILY #120 tab.cr 07/21/18 [Rx]








Patient Handouts:  Anemia, Iron tablets, capsules, extended-release tablets, 

Pantoprazole tablets


Referrals: 


Ellen Hartmann MD [Physician] -  (1 week appointment)


Carolin Kearney NP [Primary Care Provider] -  (1 week appointment)


Carlos Gonzalez MD [Physician] -  (1 week appointment)





- Patient Data


Vitals - Most Recent: 


 Last Vital Signs











Temp  97.2 F   07/21/18 09:00


 


Pulse  62   07/21/18 09:00


 


Resp  18   07/21/18 09:00


 


BP  140/52 L  07/21/18 09:00


 


Pulse Ox  95   07/21/18 09:00











Weight - Most Recent: 122 lb 11.2 oz


I&O - Last 24 hours: 


 Intake & Output











 07/21/18 07/21/18 07/21/18





 06:59 14:59 22:59


 


Intake Total 387  


 


Balance 387  











Lab Results - Last 24 hrs: 


 Laboratory Results - last 24 hr











  07/19/18 07/21/18 07/21/18 Range/Units





  17:55 02:29 02:29 


 


WBC   7.00   (4.0-11.0)  K/uL


 


RBC   5.19   (4.30-5.90)  M/uL


 


Hgb   11.4 L   (12.0-16.0)  g/dL


 


Hct   35.3 L   (36.0-46.0)  %


 


MCV   68.0 L   (80.0-98.0)  fL


 


MCH   22.0 L   (27.0-32.0)  pg


 


MCHC   32.3   (31.0-37.0)  g/dL


 


RDW Std Deviation   62.0   (28.0-62.0)  fl


 


RDW Coeff of Ulises   26 H   (11.0-15.0)  %


 


Plt Count   227   (150-400)  K/uL


 


MPV   9.90   (7.40-12.00)  fL


 


Neut % (Auto)   65.1   (48.0-80.0)  %


 


Lymph % (Auto)   19.4   (16.0-40.0)  %


 


Mono % (Auto)   11.9   (0.0-15.0)  %


 


Eos % (Auto)   2.7   (0.0-7.0)  %


 


Baso % (Auto)   0.9   (0.0-1.5)  %


 


Neut # (Auto)   4.6   (1.4-5.7)  K/uL


 


Lymph # (Auto)   1.4   (0.6-2.4)  K/uL


 


Mono # (Auto)   0.8   (0.0-0.8)  K/uL


 


Eos # (Auto)   0.2   (0.0-0.7)  K/uL


 


Baso # (Auto)   0.1   (0.0-0.1)  K/uL


 


Sodium    141  (136-145)  mmol/L


 


Potassium    3.6  (3.5-5.1)  mmol/L


 


Chloride    107  ()  mmol/L


 


Carbon Dioxide    23.4  (21.0-32.0)  mmol/L


 


BUN    4 L  (7.0-18.0)  mg/dL


 


Creatinine    0.7  (0.6-1.0)  mg/dL


 


Est Cr Clr Drug Dosing    55.77  mL/min


 


Estimated GFR (MDRD)    > 60.0  ml/min


 


Glucose    92  ()  mg/dL


 


Calcium    8.5  (8.5-10.1)  mg/dL


 


Total Bilirubin    1.2 H  (0.2-1.0)  mg/dL


 


AST    20  (15-37)  IU/L


 


ALT    14  (14-63)  IU/L


 


Alkaline Phosphatase    72  ()  U/L


 


Total Protein    6.3 L  (6.4-8.2)  g/dL


 


Albumin    3.1 L  (3.4-5.0)  g/dL


 


Globulin    3.2  (2.0-3.5)  g/dL


 


Albumin/Globulin Ratio    1.0 L  (1.3-2.8)  


 


Blood Type  A POSITIVE    


 


Antibody Screen  NEGATIVE    


 


Crossmatch  See Detail    











Med Orders - Current: 


 Current Medications





Acetaminophen (Tylenol)  650 mg PO Q4H PRN


   PRN Reason: Headache


   Last Admin: 07/20/18 21:41 Dose:  650 mg


Albuterol/Ipratropium (Duoneb 3.0-0.5 Mg/3 Ml)  3 ml NEB Q4HRRT PRN


   PRN Reason: Shortness Of Breath/wheezing


Ferrous Sulfate (Ferrous Sulfate)  300 mg PO TID [facility]


   Last Admin: 07/21/18 05:17 Dose:  300 mg


Pantoprazole Sodium (Protonix Iv***)  40 mg IVPUSH BID [facility]


   Last Admin: 07/21/18 09:23 Dose:  40 mg


Sodium Chloride (Saline Flush)  10 ml FLUSH ASDIRECTED PRN


   PRN Reason: Keep Vein Open


Sodium Chloride (Saline Flush)  2.5 ml FLUSH ASDIRECTED PRN


   PRN Reason: Keep Vein Open





Discontinued Medications





Cyanocobalamin (Vitamin B12)  1,000 mcg SUBCUT ONETIME ONE


   Stop: 07/19/18 20:55


   Last Admin: 07/19/18 21:47 Dose:  Not Given


Cyanocobalamin (Vitamin B12)  1,000 mcg IM ONETIME ONE


   Stop: 07/19/18 21:01


   Last Admin: 07/19/18 22:17 Dose:  1,000 mcg


Cyanocobalamin (Vitamin B12)  1,000 mcg IM ONETIME ONE


   Stop: 07/20/18 11:00


   Last Admin: 07/20/18 13:11 Dose:  1,000 mcg


Cyanocobalamin (Vitamin B12)  1,000 mcg IM ONETIME ONE


   Stop: 07/21/18 08:47


   Last Admin: 07/21/18 09:24 Dose:  1,000 mcg


Iopamidol (Isovue-370 (76%))  75 ml IVPUSH ONETIME STA


   Stop: 07/19/18 23:09


   Last Admin: 07/19/18 23:08 Dose:  75 ml


Miscellaneous Information (Remove Patch)  1 ea TRDERM ONETIME ONE


   Stop: 07/20/18 10:01


   Last Admin: 07/20/18 09:59 Dose:  Not Given


Morphine Sulfate (Morphine)  2 mg IVPUSH Q2H PRN


   PRN Reason: Pain (severe 7-10)


   Stop: 07/20/18 17:40


Nitroglycerin (Nitro-Dur 0.2 Mg/Hr)  0.2 mg TRDERM ONETIME ONE


   Stop: 07/19/18 21:08


   Last Admin: 07/19/18 21:37 Dose:  Not Given
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
## 2018-08-16 NOTE — PCM.OPNOTE
- General Post-Op/Procedure Note


Date of Surgery/Procedure: 08/16/18


Operative Procedure(s): Diagnostic EGD and colonoscopy


Findings: 


Inumerous hyperplastic polyps in body and fundus of stomach, gastritis, cecal 

polyp, sigmoid colon polyp 


Pre Op Diagnosis: GI bleed


Post-Op Diagnosis: Cecal polyp, sigmoid colon polyp, gastritis, gastric polyps


Anesthesia Technique: [facility]


Primary Surgeon: Ellen Hartmann


Condition: Good

## 2018-08-16 NOTE — OR
SURGEON:

MAC DIAS MD

 

DATE OF PROCEDURE:  08/16/2018

 

PREOPERATIVE DIAGNOSIS:

Gastrointestinal bleed.

 

POSTOPERATIVE DIAGNOSES:

1. Gastritis.

2. Gastric polyps.

3. Diverticulosis.

4. Cecal polyp.

5. Sigmoid colon polyp.

 

PROCEDURES PERFORMED:

Diagnostic esophagogastroduodenoscopy and colonoscopy.

 

ANESTHESIA:

Monitored anesthesia care.

 

INSTRUMENT USED:

Olympus endoscope and colonoscope.

 

EXTENT OF EXAM:

To the second portion of duodenum, to the cecum.

 

PREPARATION:

Good.

 

LIMITATIONS:

None.

 

INDICATION FOR EXAMINATION:

The patient is a 72-year-old female, who was recently admitted to the hospital

with a hemoglobin of 3.5.  She was transfused 4 units of blood with a good

improvement in her hemoglobin.  Fecal occult blood test was performed, which was

positive.  We discussed the need for diagnostic EGD and colonoscopy.  We

discussed the procedure, expected perioperative course, and risks including

bleeding, infection, or damage to surrounding structures including perforation.

The patient verbalized understanding and wishes to proceed.

 

PROCEDURE IN DETAIL:

The patient was brought into the endoscopy suite and placed in a beach chair

position.  A time-out was completed verifying the patient's name, age, date of

birth, allergies, and procedure to be performed.  Monitored anesthesia care was

induced and a bite block was placed in the patient's mouth.  Continuous oxygen

was provided via nasal cannula throughout the case.  After adequate sedation was

achieved, a well lubricated endoscope was placed in the patient's mouth and

advanced under direct visualization to the level of second portion of duodenum.

This appeared normal and a photograph was taken.  The scope was then fully

withdrawn while examining the color, texture, anatomy, and integrity of the

mucosa of the upper GI tract.  Duodenum was free of pathology.  The scope was

then brought into the stomach.  A photograph was taken of the pylorus as well as

the GE junction.  These appeared structurally normal.  The body and fundus of

the stomach were covered with innumerable small polyps, these all appeared

hyperplastic.  Biopsies were taken.  There was no evidence of ulceration;

however, the patient did have diffuse gastritis, especially apparent in the

antrum.  Biopsies were taken of the gastric antrum, body, and fundus and sent

for histologic review and H. pylori testing.  The scope was then brought into

the distal esophagus.  This appeared normal and a photograph was taken.  The

remainder of the esophageal mucosa was free of inflammation or ulceration.  The

scope was then removed and this portion of procedure terminated.  The patient

was placed into a left lateral decubitus position.  A digital rectal exam was

performed.  This exam was within normal limits.  A well lubricated colonoscope

was inserted in the rectum and advanced under direct visualization to the level

of cecum.  The cecum was identified by both visual and anatomic landmarks.  A

photograph was taken of the cecal cap; however, I was unable to retroflex the

scope within the cecum due to looping of the scope more proximally.  The scope

was then fully withdrawn while examining the color, texture, anatomy, and

integrity of the mucosa from the cecum to the anal canal.  The patient had a 2

to 3 mm raised polyp in the cecal cap, this was removed using a cold biopsy

forceps.  In the sigmoid colon, the patient was noted to have diverticulosis.

There was also a 2 to 3 mm sessile polyp in the very distal sigmoid colon.  This

was removed using cold biopsy forceps.  The scope was then brought in the rectum

and retroflexed to allow visualization of the anal canal opening.  This appeared

normal and a photograph was taken.  The scope was then straightened out and

fully withdrawn.  The cecum to anus time was greater than 20 minutes.  The

patient tolerated the procedure well and transferred to the PACU in stable

condition.

 

ENDOSCOPIC DIAGNOSES:

1. Gastritis.

2. Gastric polyps.

3. Diverticulosis.

4. Cecal polyp.

5. Sigmoid colon polyp.

 

RECOMMENDATIONS:

Follow up in clinic in 2 weeks, and the patient should continue to take

pantoprazole.

 

 

MARYA LEMOS

DD:  08/16/2018 11:46:24

DT:  08/16/2018 13:16:40

Job #:  [id number]

## 2021-01-15 ENCOUNTER — HOSPITAL ENCOUNTER (EMERGENCY)
Dept: HOSPITAL 56 - MW.ED | Age: 76
Discharge: NURSING, REHAB OR LONG-TERM CARE | End: 2021-01-15
Payer: MEDICARE

## 2021-01-15 VITALS — SYSTOLIC BLOOD PRESSURE: 110 MMHG | HEART RATE: 78 BPM | DIASTOLIC BLOOD PRESSURE: 70 MMHG

## 2021-01-15 DIAGNOSIS — Z20.822: ICD-10-CM

## 2021-01-15 DIAGNOSIS — J44.9: ICD-10-CM

## 2021-01-15 DIAGNOSIS — Z79.82: ICD-10-CM

## 2021-01-15 DIAGNOSIS — I63.9: Primary | ICD-10-CM

## 2021-01-15 DIAGNOSIS — Z79.899: ICD-10-CM

## 2021-01-15 DIAGNOSIS — Z88.5: ICD-10-CM

## 2021-01-15 DIAGNOSIS — Z88.6: ICD-10-CM

## 2021-01-15 DIAGNOSIS — R29.703: ICD-10-CM

## 2021-01-15 DIAGNOSIS — Z88.1: ICD-10-CM

## 2021-01-15 DIAGNOSIS — R47.01: ICD-10-CM

## 2021-01-15 DIAGNOSIS — Z88.4: ICD-10-CM

## 2021-01-15 DIAGNOSIS — K21.9: ICD-10-CM

## 2021-01-15 DIAGNOSIS — Z88.2: ICD-10-CM

## 2021-01-15 DIAGNOSIS — Z88.8: ICD-10-CM

## 2021-01-15 DIAGNOSIS — E78.00: ICD-10-CM

## 2021-01-15 DIAGNOSIS — M19.90: ICD-10-CM

## 2021-01-15 LAB
BUN SERPL-MCNC: 9 MG/DL (ref 7–18)
CHLORIDE SERPL-SCNC: 102 MMOL/L (ref 98–107)
CO2 SERPL-SCNC: 17.2 MMOL/L (ref 21–32)
GLUCOSE SERPL-MCNC: 105 MG/DL (ref 74–106)
POTASSIUM SERPL-SCNC: 3.1 MMOL/L (ref 3.5–5.1)
SODIUM SERPL-SCNC: 139 MMOL/L (ref 136–145)

## 2021-01-15 PROCEDURE — 51702 INSERT TEMP BLADDER CATH: CPT

## 2021-01-15 PROCEDURE — 84484 ASSAY OF TROPONIN QUANT: CPT

## 2021-01-15 PROCEDURE — 36415 COLL VENOUS BLD VENIPUNCTURE: CPT

## 2021-01-15 PROCEDURE — 96365 THER/PROPH/DIAG IV INF INIT: CPT

## 2021-01-15 PROCEDURE — 99285 EMERGENCY DEPT VISIT HI MDM: CPT

## 2021-01-15 PROCEDURE — 70450 CT HEAD/BRAIN W/O DYE: CPT

## 2021-01-15 PROCEDURE — 70496 CT ANGIOGRAPHY HEAD: CPT

## 2021-01-15 PROCEDURE — 93005 ELECTROCARDIOGRAM TRACING: CPT

## 2021-01-15 PROCEDURE — U0002 COVID-19 LAB TEST NON-CDC: HCPCS

## 2021-01-15 PROCEDURE — 70498 CT ANGIOGRAPHY NECK: CPT

## 2021-01-15 PROCEDURE — 96375 TX/PRO/DX INJ NEW DRUG ADDON: CPT

## 2021-01-15 PROCEDURE — 84443 ASSAY THYROID STIM HORMONE: CPT

## 2021-01-15 PROCEDURE — 71045 X-RAY EXAM CHEST 1 VIEW: CPT

## 2021-01-15 PROCEDURE — 85730 THROMBOPLASTIN TIME PARTIAL: CPT

## 2021-01-15 PROCEDURE — 81001 URINALYSIS AUTO W/SCOPE: CPT

## 2021-01-15 PROCEDURE — 80053 COMPREHEN METABOLIC PANEL: CPT

## 2021-01-15 PROCEDURE — 85025 COMPLETE CBC W/AUTO DIFF WBC: CPT

## 2021-01-15 PROCEDURE — 85610 PROTHROMBIN TIME: CPT

## 2021-01-15 NOTE — CT
Indication:



Stroke



Technique:



Nonenhanced axial CT imaging through the head. Sagittal and coronal 

reconstructions are provided.



Comparison:



None



Findings:



There is no intracranial hemorrhage, edema, or mass effect. Gray-white 

matter differentiation is preserved. Benign globus pallidus calcification 

is noted bilaterally. Mild patchy hypoattenuation of the cerebral white 

matter most likely reflects chronic microvascular ischemic change. There is 

normal size of the ventricles. The basal cisterns are patent.



There is changes of old suboccipital craniectomy. The mastoid air cells are 

aerated. Mucosal thickening with fluid and aerated secretions are noted in 

the maxillary sinuses and ethmoid air cells.



Impression:



1. No intracranial hemorrhage or loss of gray-white matter differentiation.



2. Mucosal thickening with fluid and aerated secretions in the maxillary 

and ethmoid sinuses. Correlate clinically for acute sinusitis.



Please note that all CT scans at this facility use dose modulation, 

iterative reconstruction, and/or weight-based dosing when appropriate to 

reduce radiation dose to as low as reasonably achievable.



Dictated by Maile Madden MD @ [facility] 15 2021  8:00PM



Signed by Dr. Maile Madden @ [facility] 15 2021  8:05PM

## 2021-01-15 NOTE — CR
INDICATION:



Stroke code



TECHNIQUE:



Chest 1 views



COMPARISON:



February 21, 2017



FINDINGS:



Cardiovascular and mediastinum:  Heart size and vasculature are normal in 

caliber and appearance.  



Lungs and pleural spaces:  Lungs are clear.  No sign of infiltrate or mass. 

 No sign of pleural effusion.  No pneumothorax.  



Bones and soft tissues:  No significant findings.



IMPRESSION:



No acute findings and no significant changes from the prior exam.



Dictated by Geoff Grimes MD @ [facility] 15 2021  8:01PM



Signed by Dr. Geoff Grimes @ [facility] 15 2021  8:02PM

## 2021-01-15 NOTE — CT
DATE: 1/15/2021



CLINICAL HISTORY:



Patient with focal neurological deficits



TECHNIQUE:



Standard helical CT image acquisition through the head and neck was 

performed after intravenous contrast bolus enhancement. Multiplanar 

reconstructed images were performed and interpreted.



COMPARISON:



CT same day.



FINDINGS:



Images are degraded by patient motion.



There is no proximal intracranial large vessel occlusion. There is mild 

diffuse intracranial atherosclerosis.



The origins of the great vessels from the aortic arch are patent. The 

origin of the right vertebral artery is patent. The origin of the left 

vertebral artery demonstrates mild narrowing.



The common carotid arteries are patent



There is a focal severe (70%) stenosis at the origin of the right internal 

carotid artery by NASCET criteria. This is caused by calcified plaque with 

a 1mm residual lumen.



There is a mild (<50%) stenosis at the origin of the left internal carotid 

artery by NASCET criteria. This is caused by calcified plaque with a <2mm 

residual lumen.



The rest of the cervical segments of the internal carotid arteries are 

patent up to their intracranial segments. The intracranial segments of the 

internal carotid arteries are patent.



The left vertebral artery is dominant. The cervical segments of the 

vertebral arteries are patent. The intracranial segments of the vertebral 

arteries are patent.



The visualized lung apices are unremarkable



The thyroid gland is unremarkable.



The soft tissues of the neck are unremarkable.



There are degenerative changes in the cervical spine.



IMPRESSION:



1. No proximal intracranial large vessel occlusion. 



2. Focal severe (70%) stenosis at the origin of the right internal carotid 

artery by NASCET criteria caused by calcified plaque with a 1mm residual 

lumen.



Please note that all CT scans at this facility use dose modulation, 

iterative reconstruction, and/or weight-based dosing when appropriate to 

reduce radiation dose to as low as reasonably achievable.



Dictated by Stephen Borjas MD @ [facility] 15 2021 11:56PM



Signed by Dr. Stephen Borjas @ Jan 16 2021 12:06AM
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
## 2021-01-15 NOTE — CT
DATE: 1/15/2021



CLINICAL HISTORY:



Patient with focal neurological deficits



TECHNIQUE:



Standard helical CT image acquisition through the head and neck was 

performed after intravenous contrast bolus enhancement. Multiplanar 

reconstructed images were performed and interpreted.



COMPARISON:



CT same day.



FINDINGS:



Images are degraded by patient motion.



There is no proximal intracranial large vessel occlusion. There is mild 

diffuse intracranial atherosclerosis.



The origins of the great vessels from the aortic arch are patent. The 

origin of the right vertebral artery is patent. The origin of the left 

vertebral artery demonstrates mild narrowing.



The common carotid arteries are patent



There is a focal severe (70%) stenosis at the origin of the right internal 

carotid artery by NASCET criteria. This is caused by calcified plaque with 

a 1mm residual lumen.



There is a mild (<50%) stenosis at the origin of the left internal carotid 

artery by NASCET criteria. This is caused by calcified plaque with a <2mm 

residual lumen.



The rest of the cervical segments of the internal carotid arteries are 

patent up to their intracranial segments. The intracranial segments of the 

internal carotid arteries are patent.



The left vertebral artery is dominant. The cervical segments of the 

vertebral arteries are patent. The intracranial segments of the vertebral 

arteries are patent.



The visualized lung apices are unremarkable



The thyroid gland is unremarkable.



The soft tissues of the neck are unremarkable.



There are degenerative changes in the cervical spine.



IMPRESSION:



1. No proximal intracranial large vessel occlusion. 



2. Focal severe (70%) stenosis at the origin of the right internal carotid 

artery by NASCET criteria caused by calcified plaque with a 1mm residual 

lumen.



Please note that all CT scans at this facility use dose modulation, 

iterative reconstruction, and/or weight-based dosing when appropriate to 

reduce radiation dose to as low as reasonably achievable.



Dictated by Stephen Borjas MD @ Jan 16 2021 12:06AM



Signed by Dr. Stephen Borjas @ Jan 16 2021 12:06AM